# Patient Record
Sex: MALE | Race: WHITE | URBAN - METROPOLITAN AREA
[De-identification: names, ages, dates, MRNs, and addresses within clinical notes are randomized per-mention and may not be internally consistent; named-entity substitution may affect disease eponyms.]

---

## 2024-08-12 ENCOUNTER — OFFICE VISIT (OUTPATIENT)
Age: 84
End: 2024-08-12
Payer: MEDICARE

## 2024-08-12 VITALS
RESPIRATION RATE: 18 BRPM | WEIGHT: 134 LBS | OXYGEN SATURATION: 100 % | SYSTOLIC BLOOD PRESSURE: 148 MMHG | TEMPERATURE: 97.3 F | HEART RATE: 80 BPM | DIASTOLIC BLOOD PRESSURE: 89 MMHG

## 2024-08-12 DIAGNOSIS — L23.7 POISON IVY DERMATITIS: Primary | ICD-10-CM

## 2024-08-12 PROCEDURE — 99213 OFFICE O/P EST LOW 20 MIN: CPT | Performed by: EMERGENCY MEDICINE

## 2024-08-12 PROCEDURE — G0463 HOSPITAL OUTPT CLINIC VISIT: HCPCS | Performed by: EMERGENCY MEDICINE

## 2024-08-12 RX ORDER — MOMETASONE FUROATE 1 MG/G
CREAM TOPICAL DAILY
Qty: 15 G | Refills: 0 | Status: SHIPPED | OUTPATIENT
Start: 2024-08-12 | End: 2024-08-19

## 2024-08-12 NOTE — PATIENT INSTRUCTIONS
Patient Education     Poison Ivy   What is this product used for?   The use of poison ivy for any health condition is not supported.  What are the precautions when taking this product?   Always check with your doctor before you use a natural product. Some products may not mix well with drugs or other natural products.  Do not swallow this product. Serious side effects, including death, may happen if you are very allergic to poison ivy, cashews, or mangoes.  Do not use this product if you are pregnant or breastfeeding.  What should I watch for?   Rash or blisters  Mouth or throat irritation  When do I need to call the doctor?   Signs of a very bad reaction. These include wheezing; chest tightness; fever; itching; bad cough; blue skin color; seizures; or swelling of face, lips, tongue, or throat. Go to the ER right away.  Very bad throwing up  Very bad loose stools  Last Reviewed Date   2022-04-13  Consumer Information Use and Disclaimer   This generalized information is a limited summary of diagnosis, treatment, and/or medication information. It is not meant to be comprehensive and should be used as a tool to help the user understand and/or assess potential diagnostic and treatment options. It does NOT include all information about conditions, treatments, medications, side effects, or risks that may apply to a specific patient. It is not intended to be medical advice or a substitute for the medical advice, diagnosis, or treatment of a health care provider based on the health care provider's examination and assessment of a patient’s specific and unique circumstances. Patients must speak with a health care provider for complete information about their health, medical questions, and treatment options, including any risks or benefits regarding use of medications. This information does not endorse any treatments or medications as safe, effective, or approved for treating a specific patient. [employer] and its  affiliates disclaim any warranty or liability relating to this information or the use thereof. The use of this information is governed by the Terms of Use, available at https://www.[employer].com/en/know/clinical-effectiveness-terms   Copyright   Copyright © 2024 [employer] and its affiliates and/or licensors. All rights reserved.

## 2024-08-12 NOTE — PROGRESS NOTES
[facility]        NAME: Emiliano Cloud is a 83 y.o. male  : 1940    MRN: [id number]  DATE: 2024  TIME: 9:25 AM    Assessment and Plan   Poison ivy dermatitis [L23.7]  1. Poison ivy dermatitis  mometasone (ELOCON) 0.1 % cream            Patient Instructions       Apply a thin film of Elocon cream to the affected area once daily for approximately 1 week.    Cool compresses locally    Follow up with PCP in 3-5 days.  Proceed to  ER if symptoms worsen.    If tests are performed, our office will contact you with results only if changes need to made to the care plan discussed with you at the visit. You can review your full results on [facility].    Chief Complaint     Chief Complaint   Patient presents with    Rash     Patient states he has a rash on the bottom of both legs. This has never occurred before.          History of Present Illness       83-year-old male is presenting with pruritic rash of the lower extremities above the ankles for approximately a week.  Patient reports he has been walking about his garden and not sure if there is spent some contact with poison sumac, oak or ivy.        Review of Systems   Review of Systems   Constitutional:  Negative for activity change, appetite change, chills, fatigue and fever.   Respiratory:  Negative for cough.    Gastrointestinal:  Negative for nausea and vomiting.   Skin:  Positive for rash.   Neurological:  Negative for light-headedness and headaches.         Current Medications       Current Outpatient Medications:     mometasone (ELOCON) 0.1 % cream, Apply topically daily for 7 days, Disp: 15 g, Rfl: 0    Current Allergies     Allergies as of 2024    (No Known Allergies)            The following portions of the patient's history were reviewed and updated as appropriate: allergies, current medications, past family history, past medical history, past social history, past surgical history and problem list.     History  reviewed. No pertinent past medical history.    History reviewed. No pertinent surgical history.    History reviewed. No pertinent family history.      Medications have been verified.        Objective   /89   Pulse 80   Temp (!) 97.3 °F (36.3 °C)   Resp 18   Wt 60.8 kg (134 lb)   SpO2 100%        Physical Exam     Physical Exam  Vitals and nursing note reviewed.   Constitutional:       General: He is not in acute distress.     Appearance: Normal appearance. He is normal weight. He is not ill-appearing, toxic-appearing or diaphoretic.   Eyes:      General: No scleral icterus.     Extraocular Movements: Extraocular movements intact.      Conjunctiva/sclera: Conjunctivae normal.      Pupils: Pupils are equal, round, and reactive to light.   Cardiovascular:      Rate and Rhythm: Normal rate and regular rhythm.      Pulses: Normal pulses.   Pulmonary:      Effort: Pulmonary effort is normal. No respiratory distress.   Musculoskeletal:         General: Normal range of motion.      Cervical back: Normal range of motion and neck supple.   Skin:     Capillary Refill: Capillary refill takes more than 3 seconds.             Comments: Erythematous papular lesions in groups and linear formation noted above the ankles, bilateral.  Nontender on palpation.  Vesicular lesions noted some weeping.  No signs of infection.   Neurological:      Mental Status: He is alert and oriented to person, place, and time.      Coordination: Coordination normal.      Gait: Gait normal.   Psychiatric:         Mood and Affect: Mood normal.         Behavior: Behavior normal.

## 2025-01-06 ENCOUNTER — HOSPITAL ENCOUNTER (EMERGENCY)
Facility: HOSPITAL | Age: 85
Discharge: HOME | End: 2025-01-06
Attending: EMERGENCY MEDICINE
Payer: COMMERCIAL

## 2025-01-06 ENCOUNTER — APPOINTMENT (EMERGENCY)
Dept: RADIOLOGY | Facility: HOSPITAL | Age: 85
End: 2025-01-06
Payer: COMMERCIAL

## 2025-01-06 VITALS
DIASTOLIC BLOOD PRESSURE: 80 MMHG | TEMPERATURE: 98.2 F | OXYGEN SATURATION: 98 % | WEIGHT: 158.29 LBS | SYSTOLIC BLOOD PRESSURE: 162 MMHG | RESPIRATION RATE: 18 BRPM | HEART RATE: 93 BPM

## 2025-01-06 DIAGNOSIS — R60.0 PERIPHERAL EDEMA: ICD-10-CM

## 2025-01-06 DIAGNOSIS — J90 PLEURAL EFFUSION: Primary | ICD-10-CM

## 2025-01-06 LAB
ALBUMIN SERPL BCG-MCNC: 4.2 G/DL (ref 3.5–5)
ALP SERPL-CCNC: 82 U/L (ref 34–104)
ALT SERPL W P-5'-P-CCNC: 13 U/L (ref 7–52)
ANION GAP SERPL CALCULATED.3IONS-SCNC: 8 MMOL/L (ref 4–13)
AST SERPL W P-5'-P-CCNC: 24 U/L (ref 13–39)
BASOPHILS # BLD AUTO: 0.01 THOUSANDS/ΜL (ref 0–0.1)
BASOPHILS NFR BLD AUTO: 0 % (ref 0–1)
BILIRUB SERPL-MCNC: 0.78 MG/DL (ref 0.2–1)
BNP SERPL-MCNC: 386 PG/ML (ref 0–100)
BUN SERPL-MCNC: 20 MG/DL (ref 5–25)
CALCIUM SERPL-MCNC: 9 MG/DL (ref 8.4–10.2)
CARDIAC TROPONIN I PNL SERPL HS: 8 NG/L (ref ?–50)
CHLORIDE SERPL-SCNC: 101 MMOL/L (ref 96–108)
CO2 SERPL-SCNC: 26 MMOL/L (ref 21–32)
CREAT SERPL-MCNC: 1.23 MG/DL (ref 0.6–1.3)
EOSINOPHIL # BLD AUTO: 0.06 THOUSAND/ΜL (ref 0–0.61)
EOSINOPHIL NFR BLD AUTO: 1 % (ref 0–6)
ERYTHROCYTE [DISTWIDTH] IN BLOOD BY AUTOMATED COUNT: 15.5 % (ref 11.6–15.1)
GFR SERPL CREATININE-BSD FRML MDRD: 53 ML/MIN/1.73SQ M
GLUCOSE SERPL-MCNC: 93 MG/DL (ref 65–140)
HCT VFR BLD AUTO: 35.2 % (ref 36.5–49.3)
HGB BLD-MCNC: 11.1 G/DL (ref 12–17)
IMM GRANULOCYTES # BLD AUTO: 0.03 THOUSAND/UL (ref 0–0.2)
IMM GRANULOCYTES NFR BLD AUTO: 1 % (ref 0–2)
LYMPHOCYTES # BLD AUTO: 1.01 THOUSANDS/ΜL (ref 0.6–4.47)
LYMPHOCYTES NFR BLD AUTO: 20 % (ref 14–44)
MCH RBC QN AUTO: 28.2 PG (ref 26.8–34.3)
MCHC RBC AUTO-ENTMCNC: 31.5 G/DL (ref 31.4–37.4)
MCV RBC AUTO: 89 FL (ref 82–98)
MONOCYTES # BLD AUTO: 0.52 THOUSAND/ΜL (ref 0.17–1.22)
MONOCYTES NFR BLD AUTO: 10 % (ref 4–12)
NEUTROPHILS # BLD AUTO: 3.56 THOUSANDS/ΜL (ref 1.85–7.62)
NEUTS SEG NFR BLD AUTO: 68 % (ref 43–75)
NRBC BLD AUTO-RTO: 0 /100 WBCS
PLATELET # BLD AUTO: 107 THOUSANDS/UL (ref 149–390)
PMV BLD AUTO: 11.3 FL (ref 8.9–12.7)
POTASSIUM SERPL-SCNC: 4.1 MMOL/L (ref 3.5–5.3)
PROT SERPL-MCNC: 7.4 G/DL (ref 6.4–8.4)
RBC # BLD AUTO: 3.94 MILLION/UL (ref 3.88–5.62)
SODIUM SERPL-SCNC: 135 MMOL/L (ref 135–147)
WBC # BLD AUTO: 5.19 THOUSAND/UL (ref 4.31–10.16)

## 2025-01-06 PROCEDURE — 99284 EMERGENCY DEPT VISIT MOD MDM: CPT | Performed by: EMERGENCY MEDICINE

## 2025-01-06 PROCEDURE — 83880 ASSAY OF NATRIURETIC PEPTIDE: CPT | Performed by: EMERGENCY MEDICINE

## 2025-01-06 PROCEDURE — 84484 ASSAY OF TROPONIN QUANT: CPT | Performed by: EMERGENCY MEDICINE

## 2025-01-06 PROCEDURE — 85025 COMPLETE CBC W/AUTO DIFF WBC: CPT | Performed by: EMERGENCY MEDICINE

## 2025-01-06 PROCEDURE — 80053 COMPREHEN METABOLIC PANEL: CPT | Performed by: EMERGENCY MEDICINE

## 2025-01-06 PROCEDURE — 71045 X-RAY EXAM CHEST 1 VIEW: CPT

## 2025-01-06 PROCEDURE — 99285 EMERGENCY DEPT VISIT HI MDM: CPT

## 2025-01-06 PROCEDURE — 36415 COLL VENOUS BLD VENIPUNCTURE: CPT

## 2025-01-06 PROCEDURE — 93005 ELECTROCARDIOGRAM TRACING: CPT

## 2025-01-06 RX ORDER — PHYTONADIONE 5 MG/1
5 TABLET ORAL DAILY
COMMUNITY

## 2025-01-06 RX ORDER — FOLIC ACID 1 MG/1
1 TABLET ORAL DAILY
COMMUNITY

## 2025-01-06 RX ORDER — POTASSIUM CHLORIDE 600 MG/1
8 CAPSULE, EXTENDED RELEASE ORAL DAILY
COMMUNITY

## 2025-01-06 RX ORDER — PYRIDOXINE HCL (VITAMIN B6) 25 MG
25 TABLET ORAL DAILY
COMMUNITY

## 2025-01-06 RX ORDER — TAMSULOSIN HYDROCHLORIDE 0.4 MG/1
0.4 CAPSULE ORAL
COMMUNITY

## 2025-01-06 RX ORDER — ATORVASTATIN CALCIUM 20 MG/1
20 TABLET, FILM COATED ORAL DAILY
COMMUNITY
Start: 2024-08-22

## 2025-01-06 RX ORDER — METOPROLOL TARTRATE 25 MG/1
25 TABLET, FILM COATED ORAL DAILY
COMMUNITY

## 2025-01-06 RX ORDER — AMIODARONE HYDROCHLORIDE 200 MG/1
200 TABLET ORAL DAILY
COMMUNITY
Start: 2025-01-03

## 2025-01-06 NOTE — ED PROVIDER NOTES
Time reflects when diagnosis was documented in both MDM as applicable and the Disposition within this note       Time User Action Codes Description Comment    1/6/2025  1:21 PM Gabby Sharma Add [J90] Pleural effusion     1/6/2025  1:21 PM Gabby Sharma Add [R60.0] Peripheral edema           ED Disposition       ED Disposition   Discharge    Condition   Stable    Date/Time   Mon Jan 6, 2025  1:21 PM    Comment   Emiliano Cloud discharge to home/self care.                   Assessment & Plan       Medical Decision Making  Patient is an 84-year-old male with a past medical history of atrial fibrillation on Eliquis and Lasix, CAD, recent hospital stay for left inguinal herniorrhaphy with mesh.  Was seen in follow-up by his PCP on December 13 and by general surgery on December 18.  Had resumed home medicines including Lasix and Eliquis.  Presents today with chest tightness for 3 days.  He states that the pain is worse at night and he notices it most when he is laying down flat.  Pain is substernal and right-sided primarily.  Denies any associated complaints such as shortness of breath, nausea, diaphoresis.  States that when he is up and walking through the day he actually feels better.  Reports that his legs are quite swollen but notes that this is ongoing, and has been present since he was diagnosed with atrial fibrillation.  He has resumed his Lasix and Eliquis postsurgery and has been elevating his legs.  Based on increasing symptoms at night, suspect GERD versus CHF.  Recent nuclear stress testing within normal limits.    Amount and/or Complexity of Data Reviewed  External Data Reviewed: ECG and notes.     Details: See narrative    Negative EKG portion of lexiscan nuclear stress test.  Adequate   hemodynamic response to lexiscan noted.   Nuclear portion dictated separately.   Resting ECG   ECG is abnormal. Resting ECG shows no ST-segment deviation. The ECG shows atrial flutter.     Labs: ordered.  Decision-making details documented in ED Course.  Radiology: ordered and independent interpretation performed. Decision-making details documented in ED Course.     Details: Small pleural effusions, cardiomegaly  ECG/medicine tests: independent interpretation performed.     Details: Atrial fibrillation at 75 bpm, normal axis, prolonged QTc noted at 482 ms, abnormal EKG, no priors for comparison, no acute ischemia    Risk  Decision regarding hospitalization.        ED Course as of 01/06/25 1807   Mon Jan 06, 2025   1216 HS Troponin 0hr (reflex protocol)   1216 B-Type Natriuretic Peptide(BNP)(!)   1313 May 2024 echocardiogram:   This result has an attachment that is not available.   ·  Left Ventricle: Systolic function is normal with an ejection fraction   of 55-60%. Indeterminate diastolic function due to atrial fibrillation.   ·  Left Atrium: Left atrium cavity is mildly dilated.   ·  Right Ventricle: Right ventricle cavity is mildly dilated.   ·  Right Atrium: Right atrium cavity is moderately dilated.   ·  Aortic Valve: The aortic valve is trileaflet. The leaflets are mildly   thickened. The leaflets are mildly calcified.   ·  Tricuspid Valve: There is mild regurgitation.      1314 Discussed laboratory studies with patient.  Noted that he has previously had small pleural effusions.  Suspect ongoing CHF based on increasing lower extremity edema.  Offered hospital observation stay with IV diuresis versus additional p.[facility]. diuresis at [facility] as he is not hypoxic with no respiratory distress.  Patient strongly prefers discharge declines admission for IV diuretics.   1314 XR chest pa & lateral (6/24/24 2116)  History: Trauma     2 views of the chest show small bilateral pleural effusions, and mild basilar   atelectasis. Questionable mild pulmonary interstitial edema. Cardiac silhouette   appears slightly enlarged. No pneumothorax identified. No acute bony injury   seen.          Medications - No data to display    ED Risk  Strat Scores                          SBIRT 22yo+      Flowsheet Row Most Recent Value   SOLOMON: How many times in the past year have you...    Used an illegal drug or used a prescription medication for non-medical reasons? Never Filed at: 01/06/2025 1025                            History of Present Illness       Chief Complaint   Patient presents with    Chest Pain     Pt reports chest tightness for the past 3 days        Past Medical History:   Diagnosis Date    Afib (HCC)       Past Surgical History:   Procedure Laterality Date    HERNIA REPAIR        History reviewed. No pertinent family history.   Social History     Tobacco Use    Smoking status: Never    Smokeless tobacco: Never   Vaping Use    Vaping status: Never Used   Substance Use Topics    Alcohol use: Never    Drug use: Never      E-Cigarette/Vaping    E-Cigarette Use Never User       E-Cigarette/Vaping Substances      I have reviewed and agree with the history as documented.     Patient presents with 3 days of chest tightness, worse at night.          Review of Systems   Respiratory:  Positive for chest tightness.    Cardiovascular:  Positive for leg swelling.   All other systems reviewed and are negative.          Objective       ED Triage Vitals [01/06/25 1024]   Temperature Pulse Blood Pressure Respirations SpO2 Patient Position - Orthostatic VS   98.2 °F (36.8 °C) 75 (!) 193/88 16 96 % Sitting      Temp Source Heart Rate Source BP Location FiO2 (%) Pain Score    Temporal Monitor Left arm -- --      Vitals      Date and Time Temp Pulse SpO2 Resp BP Pain Score FACES Pain Rating User   01/06/25 1345 -- 93 98 % 18 162/80 -- -- AA   01/06/25 1315 -- 79 97 % 21 165/101 -- -- SB   01/06/25 1230 -- 73 96 % 18 188/100 -- -- VMW   01/06/25 1200 -- 75 98 % 21 166/96 -- -- SB   01/06/25 1130 -- 92 97 % 20 152/104 -- -- SB   01/06/25 1024 98.2 °F (36.8 °C) 75 96 % 16 193/88 -- -- FB            Physical Exam  Vitals and nursing note reviewed.   Constitutional:        General: He is not in acute distress.     Appearance: Normal appearance.   HENT:      Head: Normocephalic and atraumatic.      Right Ear: External ear normal.      Left Ear: External ear normal.      Nose: Nose normal.   Cardiovascular:      Rate and Rhythm: Normal rate. Rhythm irregular.      Heart sounds: Normal heart sounds.   Pulmonary:      Effort: Pulmonary effort is normal.      Breath sounds: Normal breath sounds.   Abdominal:      General: There is no distension.      Palpations: Abdomen is soft.      Tenderness: There is no abdominal tenderness.   Musculoskeletal:      Right lower leg: Edema present.      Left lower leg: Edema present.   Skin:     General: Skin is warm and dry.   Neurological:      General: No focal deficit present.      Mental Status: He is alert and oriented to person, place, and time. Mental status is at baseline.   Psychiatric:         Behavior: Behavior normal.         Results Reviewed       Procedure Component Value Units Date/Time    B-Type Natriuretic Peptide(BNP) [[id number]]  (Abnormal) Collected: 01/06/25 1035    Lab Status: Final result Specimen: Blood from Arm, Right Updated: 01/06/25 1128      pg/mL     HS Troponin 0hr (reflex protocol) [[id number]]  (Normal) Collected: 01/06/25 1035    Lab Status: Final result Specimen: Blood from Arm, Right Updated: 01/06/25 1108     hs TnI 0hr 8 ng/L     Comprehensive metabolic panel [[id number]] Collected: 01/06/25 1035    Lab Status: Final result Specimen: Blood from Arm, Right Updated: 01/06/25 1101     Sodium 135 mmol/L      Potassium 4.1 mmol/L      Chloride 101 mmol/L      CO2 26 mmol/L      ANION GAP 8 mmol/L      BUN 20 mg/dL      Creatinine 1.23 mg/dL      Glucose 93 mg/dL      Calcium 9.0 mg/dL      AST 24 U/L      ALT 13 U/L      Alkaline Phosphatase 82 U/L      Total Protein 7.4 g/dL      Albumin 4.2 g/dL      Total Bilirubin 0.78 mg/dL      eGFR 53 ml/min/1.73sq m     Narrative:      [employer]  guidelines for Chronic Kidney Disease (CKD):     Stage 1 with normal or high GFR (GFR > 90 mL/min/1.73 square meters)    Stage 2 Mild CKD (GFR = 60-89 mL/min/1.73 square meters)    Stage 3A Moderate CKD (GFR = 45-59 mL/min/1.73 square meters)    Stage 3B Moderate CKD (GFR = 30-44 mL/min/1.73 square meters)    Stage 4 Severe CKD (GFR = 15-29 mL/min/1.73 square meters)    Stage 5 End Stage CKD (GFR <15 mL/min/1.73 square meters)  Note: GFR calculation is accurate only with a steady state creatinine    CBC and differential [[id number]]  (Abnormal) Collected: 01/06/25 1035    Lab Status: Final result Specimen: Blood from Arm, Right Updated: 01/06/25 1054     WBC 5.19 Thousand/uL      RBC 3.94 Million/uL      Hemoglobin 11.1 g/dL      Hematocrit 35.2 %      MCV 89 fL      MCH 28.2 pg      MCHC 31.5 g/dL      RDW 15.5 %      MPV 11.3 fL      Platelets 107 Thousands/uL      nRBC 0 /100 WBCs      Segmented % 68 %      Immature Grans % 1 %      Lymphocytes % 20 %      Monocytes % 10 %      Eosinophils Relative 1 %      Basophils Relative 0 %      Absolute Neutrophils 3.56 Thousands/µL      Absolute Immature Grans 0.03 Thousand/uL      Absolute Lymphocytes 1.01 Thousands/µL      Absolute Monocytes 0.52 Thousand/µL      Eosinophils Absolute 0.06 Thousand/µL      Basophils Absolute 0.01 Thousands/µL             XR chest 1 view portable   Final Interpretation by Yue Tan MD (01/06 1225)      Small bilateral pleural effusions with subjacent atelectasis.            Workstation performed: [id number]             Procedures    ED Medication and Procedure Management   Prior to Admission Medications   Prescriptions Last Dose Informant Patient Reported? Taking?   Ascorbic Acid, Vitamin C, (VITAMIN C) 100 MG tablet   Yes No   Sig: Take 100 mg by mouth daily   MAGNESIUM PO   Yes No   Sig: Take 1 tablet by mouth once a week   amiodarone 200 mg tablet   Yes Yes   Sig: Take 200 mg by mouth daily   apixaban (ELIQUIS) 5 mg   Yes Yes    Sig: Take 5 mg by mouth 2 (two) times a day   atorvastatin (LIPITOR) 20 mg tablet   Yes Yes   Sig: Take 20 mg by mouth daily   cyanocobalamin (VITAMIN B-12) 50 MCG tablet   Yes No   Sig: Take 50 mcg by mouth daily   folic acid (FOLVITE) 1 mg tablet   Yes No   Sig: Take 1 mg by mouth daily   metoprolol tartrate (LOPRESSOR) 25 mg tablet   Yes No   Sig: Take 25 mg by mouth daily   mometasone (ELOCON) 0.1 % cream   No No   Sig: Apply topically daily for 7 days   phytonadione (MEPHYTON) 5 mg tablet   Yes No   Sig: Take 5 mg by mouth daily   potassium chloride (MICRO-K) 8 mEq CR capsule   Yes No   Sig: Take 8 mEq by mouth daily   pyridoxine (B-6) 25 MG tablet   Yes No   Sig: Take 25 mg by mouth daily   tamsulosin (FLOMAX) 0.4 mg   Yes No   Sig: Take 0.4 mg by mouth daily with dinner      Facility-Administered Medications: None     Discharge Medication List as of 1/6/2025  1:24 PM        CONTINUE these medications which have NOT CHANGED    Details   amiodarone 200 mg tablet Take 200 mg by mouth daily, Starting Fri 1/3/2025, Historical Med      apixaban (ELIQUIS) 5 mg Take 5 mg by mouth 2 (two) times a day, Starting u 12/12/2024, Until Fri 12/12/2025, Historical Med      atorvastatin (LIPITOR) 20 mg tablet Take 20 mg by mouth daily, Starting Thu 8/22/2024, Historical Med      Ascorbic Acid, Vitamin C, (VITAMIN C) 100 MG tablet Take 100 mg by mouth daily, Historical Med      cyanocobalamin (VITAMIN B-12) 50 MCG tablet Take 50 mcg by mouth daily, Historical Med      folic acid (FOLVITE) 1 mg tablet Take 1 mg by mouth daily, Historical Med      MAGNESIUM PO Take 1 tablet by mouth once a week, Historical Med      metoprolol tartrate (LOPRESSOR) 25 mg tablet Take 25 mg by mouth daily, Historical Med      mometasone (ELOCON) 0.1 % cream Apply topically daily for 7 days, Starting Mon 8/12/2024, Until Mon 8/19/2024, Normal      phytonadione (MEPHYTON) 5 mg tablet Take 5 mg by mouth daily, Historical Med      potassium chloride  (MICRO-K) 8 mEq CR capsule Take 8 mEq by mouth daily, Historical Med      pyridoxine (B-6) 25 MG tablet Take 25 mg by mouth daily, Historical Med      tamsulosin (FLOMAX) 0.4 mg Take 0.4 mg by mouth daily with dinner, Historical Med           No discharge procedures on file.  ED SEPSIS DOCUMENTATION   Time reflects when diagnosis was documented in both MDM as applicable and the Disposition within this note       Time User Action Codes Description Comment    1/6/2025  1:21 PM Gabby Sharma Add [J90] Pleural effusion     1/6/2025  1:21 PM Gabby Sharma Add [R60.0] Peripheral edema                  Gabby Sharma MD  01/06/25 8434

## 2025-01-06 NOTE — DISCHARGE INSTRUCTIONS
You were seen and evaluated today for chest pain.  Your test results demonstrated fluid retention.   We discussed admission for IV diuresis vs additional doses of your medication at home. Please take your water pill (lasix) TWICE daily for TWO days. Monitor your urine output.     Please take all medications as instructed. Follow up with your PCP as discussed.   RETURN TO THE EMERGENCY DEPARTMENT if you develop new or worsening symptoms and are unable to see your PCP.

## 2025-01-07 LAB
ATRIAL RATE: 75 BPM
QRS AXIS: 83 DEGREES
QRSD INTERVAL: 88 MS
QT INTERVAL: 432 MS
QTC INTERVAL: 482 MS
T WAVE AXIS: 55 DEGREES
VENTRICULAR RATE: 75 BPM

## 2025-01-07 PROCEDURE — 93010 ELECTROCARDIOGRAM REPORT: CPT | Performed by: INTERNAL MEDICINE
